# Patient Record
Sex: FEMALE | Race: WHITE
[De-identification: names, ages, dates, MRNs, and addresses within clinical notes are randomized per-mention and may not be internally consistent; named-entity substitution may affect disease eponyms.]

---

## 2017-01-06 ENCOUNTER — HOSPITAL ENCOUNTER (EMERGENCY)
Dept: HOSPITAL 25 - UCCORT | Age: 42
Discharge: HOME | End: 2017-01-06
Payer: COMMERCIAL

## 2017-01-06 VITALS — DIASTOLIC BLOOD PRESSURE: 80 MMHG | SYSTOLIC BLOOD PRESSURE: 127 MMHG

## 2017-01-06 DIAGNOSIS — J01.90: Primary | ICD-10-CM

## 2017-01-06 DIAGNOSIS — Z88.3: ICD-10-CM

## 2017-01-06 DIAGNOSIS — B96.89: ICD-10-CM

## 2017-01-06 PROCEDURE — 99212 OFFICE O/P EST SF 10 MIN: CPT

## 2017-01-06 PROCEDURE — G0463 HOSPITAL OUTPT CLINIC VISIT: HCPCS

## 2017-01-06 NOTE — UC
Throat Pain/Nasal Rikki HPI





- HPI Summary


HPI Summary: 





About 3 weeks ago had a couple days of runny nose and PND, quickly turned into 

maxillary sinus pressure and burning, currently has pain and pressure in upper 

teeth and forehead. Hx of bacterial sinusitis, has seen ENT out of state in the 

past. Denies fever, trouble breathing, or facial swelling.





About 6 weeks ago moved into new house that previously had cats, and pt is 

allergic to cats.





- History of Current Complaint


Chief Complaint: UCRespiratory


Stated Complaint: SINUS


Time Seen by Provider: 01/06/17 10:24


Hx Obtained From: Patient


Hx Last Menstrual Period: 12/27/16


Pregnant?: No


Onset/Duration: Gradual Onset, Lasting Weeks


Severity: Moderate


Cough: None


Associated Signs & Symptoms: Positive: Sinus Discomfort, Nasal Discharge.  

Negative: Fever, Vomiting, Rash





- Allergies/Home Medications


Allergies/Adverse Reactions: 


 Allergies











Allergy/AdvReac Type Severity Reaction Status Date / Time


 


Clarithromycin [From Biaxin] Allergy Severe Vomiting Verified 01/06/17 10:26











Home Medications: 


 Home Medications





Ibuprofen TAB* [Advil TAB*] 600 mg PO Q6H PRN 01/06/17 [History Confirmed 01/06/ 17]











PMH/Surg Hx/FS Hx/Imm Hx


Previously Healthy: Yes


Endocrine History Of: 


   Denies: Diabetes, Thyroid Disease, Hyperthyroidism, Hypothyroidism, 

Dyslipidemia


Cardiovascular History Of: 


   Denies: Cardiac Disorders, Hypertension, Pacemaker/ICD, Myocardial Infarction

, Congestive Heart Failure, Atrial Fibrillation, Deep Vein Thrombosis, Bleeding 

Disorders


Respiratory History Of: 


   Denies: COPD, Asthma, Bronchitis, Pneumonia, Pulmonary Embolism


GI/ History Of: 


   Denies: Gastroesophageal Reflux, Ulcer, Gastrointestinal Bleed, Gall Bladder 

Disease, Kidney Stones, Diverticulitis, Renal Disease, Urosepsis


Neurological History Of: 


   Denies: TIA, CVA, Dementia, Seizures, Migraine


Psychological History Of: Reports: Anxiety


   Denies: Depression, Bipolar Disorder, Schizophrenia, Post Traumatic Stress 

Disorder


Cancer History Of: 


   Denies: Lung Cancer, Colorectal Cancer, Breast Cancer, Prostate Cancer, 

Cervical Cancer





- Surgical History


Surgical History: Yes


Surgery Procedure, Year, and Place: Tubal ligation, uterine ablation, 2 uterine 

d and c





- Family History


Known Family History: 


   Negative: Blood Disorder





- Social History


Occupation: Employed Full-time


Lives: With Family


Alcohol Use: Daily


Alcohol Amount: 1 wine


Substance Use Type: None


Smoking Status (MU): Never Smoked Tobacco





- Immunization History


Most Recent Influenza Vaccination: Current for 2015/2016 Season





Review of Systems


Constitutional: Negative


Skin: Negative


Eyes: Negative


ENT: Nasal Discharge, Other - sinus discomfort


Respiratory: Negative


Cardiovascular: Negative


Gastrointestinal: Negative


Genitourinary: Negative


Motor: Negative


Neurovascular: Negative


Musculoskeletal: Negative


Neurological: Negative


Psychological: Negative


All Other Systems Reviewed And Are Negative: Yes





Physical Exam


Triage Information Reviewed: Yes


Appearance: Well-Appearing, No Pain Distress, Well-Nourished


Vital Signs: 


 Initial Vital Signs











Temp  99.3 F   01/06/17 10:20


 


Pulse  78   01/06/17 10:20


 


Resp  16   01/06/17 10:20


 


BP  127/80   01/06/17 10:20


 


Pulse Ox  100   01/06/17 10:20











Vital Signs Reviewed: Yes


Eye Exam: Normal, Other - PERRL


Eyes: Positive: Conjunctiva Clear


ENT: Positive: Hearing grossly normal, Pharynx normal, Nasal congestion, TMs 

normal, Other: - sinus tenderness.  Negative: Nasal drainage, Tonsillar swelling


Dental Exam: Normal


Dental: Negative: Percussion Tenderness @, Gross Decay/Caries @, Dental 

Fracture @


Neck exam: Normal


Neck: Positive: Supple, Nontender, No Lymphadenopathy


Respiratory Exam: Normal


Respiratory: Positive: Chest non-tender, Lungs clear, Normal breath sounds, No 

respiratory distress, No accessory muscle use


Musculoskeletal Exam: Normal


Neurological Exam: Normal


Neurological: Positive: Alert


Psychological Exam: Normal


Skin Exam: Normal





Throat Pain/Nasal Course/Dx





- Differential Dx/Diagnosis


Provider Diagnoses: Acute bacterial sinusitis





Discharge





- Discharge Plan


Condition: Stable


Disposition: HOME


Prescriptions: 


Amoxicillin/Clavulanate TAB* [Augmentin *] 875 mg PO BID #14 tab


Patient Education Materials:  Rhinosinusitis (ED)


Referrals: 


Natalie Aguirre MD [Primary Care Provider] - If Needed

## 2017-03-07 ENCOUNTER — HOSPITAL ENCOUNTER (EMERGENCY)
Dept: HOSPITAL 25 - UCCORT | Age: 42
Discharge: HOME | End: 2017-03-07
Payer: COMMERCIAL

## 2017-03-07 VITALS — DIASTOLIC BLOOD PRESSURE: 88 MMHG | SYSTOLIC BLOOD PRESSURE: 134 MMHG

## 2017-03-07 DIAGNOSIS — Z88.1: ICD-10-CM

## 2017-03-07 DIAGNOSIS — R06.2: ICD-10-CM

## 2017-03-07 DIAGNOSIS — Z87.891: ICD-10-CM

## 2017-03-07 DIAGNOSIS — J02.9: Primary | ICD-10-CM

## 2017-03-07 DIAGNOSIS — H92.01: ICD-10-CM

## 2017-03-07 PROCEDURE — G0463 HOSPITAL OUTPT CLINIC VISIT: HCPCS

## 2017-03-07 PROCEDURE — 87651 STREP A DNA AMP PROBE: CPT

## 2017-03-07 PROCEDURE — 99212 OFFICE O/P EST SF 10 MIN: CPT

## 2017-03-07 NOTE — UC
Throat Pain/Nasal Rikki HPI





- HPI Summary


HPI Summary: 





Patient had what she thought was the flu a week  now has sore throat and right 

ear pain. 





- History of Current Complaint


Chief Complaint: UCRespiratory


Stated Complaint: SORE THROAT/COUGH


Time Seen by Provider: 03/07/17 18:32


Hx Obtained From: Patient


Hx Last Menstrual Period: 2/14/17


Pregnant?: No


Onset/Duration: Sudden Onset, Lasting Days


Severity: Moderate


Cough: Nonproductive


Associated Signs & Symptoms: Positive: Dysphagia, Hoarseness





- Allergies/Home Medications


Allergies/Adverse Reactions: 


 Allergies











Allergy/AdvReac Type Severity Reaction Status Date / Time


 


Clarithromycin [From Biaxin] Allergy Severe Vomiting Verified 03/07/17 18:27














PMH/Surg Hx/FS Hx/Imm Hx


Previously Healthy: Yes


Endocrine History Of: 


   Denies: Diabetes, Thyroid Disease, Hyperthyroidism, Hypothyroidism, 

Dyslipidemia


Cardiovascular History Of: 


   Denies: Cardiac Disorders, Hypertension, Pacemaker/ICD, Myocardial Infarction

, Congestive Heart Failure, Atrial Fibrillation, Deep Vein Thrombosis, Bleeding 

Disorders


Respiratory History Of: 


   Denies: COPD, Asthma, Bronchitis, Pneumonia, Pulmonary Embolism


GI/ History Of: 


   Denies: Gastroesophageal Reflux, Ulcer, Gastrointestinal Bleed, Gall Bladder 

Disease, Kidney Stones, Diverticulitis, Renal Disease, Urosepsis


Neurological History Of: 


   Denies: TIA, CVA, Dementia, Seizures, Migraine


Psychological History Of: Reports: Anxiety


   Denies: Depression, Bipolar Disorder, Schizophrenia, Post Traumatic Stress 

Disorder


Cancer History Of: 


   Denies: Lung Cancer, Colorectal Cancer, Breast Cancer, Prostate Cancer, 

Cervical Cancer





- Surgical History


Surgical History: Yes


Surgery Procedure, Year, and Place: Tubal ligation, uterine ablation, 2 uterine 

d and c





- Family History


Known Family History: 


   Negative: Cardiac Disease, Hypertension, Blood Disorder





- Social History


Alcohol Use: Daily


Alcohol Amount: 1 wine


Substance Use Type: None


Smoking Status (MU): Former Smoker





- Immunization History


Most Recent Influenza Vaccination: Current for 2015/2016 Season





Review of Systems


Constitutional: Fatigue


Skin: Negative


Eyes: Negative


ENT: Sore Throat, Ear Ache


Respiratory: Cough


Cardiovascular: Negative


Gastrointestinal: Negative


Genitourinary: Negative


Motor: Negative


Neurovascular: Negative


Musculoskeletal: Negative


Neurological: Negative


Psychological: Negative


All Other Systems Reviewed And Are Negative: Yes





Physical Exam


Triage Information Reviewed: Yes


Appearance: Well-Nourished, Ill-Appearing, Pain Distress


Vital Signs: 


 Initial Vital Signs











Temp  99.5 F   03/07/17 18:18


 


Pulse  82   03/07/17 18:18


 


Resp  18   03/07/17 18:18


 


BP  134/88   03/07/17 18:18


 


Pulse Ox  100   03/07/17 18:18











Vital Signs Reviewed: Yes


Eye Exam: Normal


Eyes: Positive: Conjunctiva Clear


ENT: Positive: Pharyngeal erythema - with posterior exudate, TM bulging, 

Tonsillar swelling, Tonsillar exudate, Muffled/hoarse voice


Dental Exam: Normal


Neck exam: Normal


Neck: Positive: Supple, Nontender, No Lymphadenopathy


Respiratory Exam: Normal


Respiratory: Positive: Chest non-tender, No respiratory distress, No accessory 

muscle use, Wheezing, Inspiration


Cardiovascular Exam: Normal


Cardiovascular: Positive: RRR, No Murmur, Pulses Normal


Abdominal Exam: Normal


Abdomen Description: Positive: Nontender, No Organomegaly, Soft


Bowel Sounds: Positive: Present


Musculoskeletal Exam: Normal


Musculoskeletal: Positive: Strength Intact, ROM Intact, No Edema


Neurological Exam: Normal


Neurological: Positive: Alert, Muscle Tone Normal


Psychological Exam: Normal





Throat Pain/Nasal Course/Dx





- Course


Course Of Treatment: hx obtained, exam performed, meds reviewed, rapid strep 

obtained and is negative, treated for wheezing





- Differential Dx/Diagnosis


Differential Diagnosis/HQI/PQRI: Influenza, Laryngitis, Otitis Media, 

Pharyngitis, Sinusitis, URI


Provider Diagnoses: wheezing.  pharyngitis





Discharge





- Discharge Plan


Condition: Stable


Disposition: HOME


Prescriptions: 


predniSONE TAB* [Deltasone TAB*] 40 mg PO DAILY #10 tab


Patient Education Materials:  Viral Syndrome (ED)


Additional Instructions: 


I believe you are still experiencing some lasting effects from your illness.


1. Take the prednisone starting tomorrow morning.


2. Use the cough syrup as needed.


#. increase your fluid intake and get plenty of rest.

## 2017-03-21 ENCOUNTER — HOSPITAL ENCOUNTER (EMERGENCY)
Dept: HOSPITAL 25 - UCCORT | Age: 42
Discharge: HOME | End: 2017-03-21
Payer: COMMERCIAL

## 2017-03-21 VITALS — DIASTOLIC BLOOD PRESSURE: 80 MMHG | SYSTOLIC BLOOD PRESSURE: 138 MMHG

## 2017-03-21 DIAGNOSIS — J32.9: Primary | ICD-10-CM

## 2017-03-21 DIAGNOSIS — Z88.1: ICD-10-CM

## 2017-03-21 DIAGNOSIS — Z87.891: ICD-10-CM

## 2017-03-21 PROCEDURE — G0463 HOSPITAL OUTPT CLINIC VISIT: HCPCS

## 2017-03-21 PROCEDURE — 99212 OFFICE O/P EST SF 10 MIN: CPT

## 2017-03-21 NOTE — UC
Throat Pain/Nasal Rikki HPI





- HPI Summary


HPI Summary: 





complaint of sore throat, nasal congestion and cough 


started approx 2 weeks ago


sinus pressure which has been worsening this week


productive cough with green sputum


 feels exhausted


 intermittent headaches


ears feel plugged


denies fever and chills


 was taking ibuprofen for headache relief





- History of Current Complaint


Chief Complaint: UCRespiratory


Stated Complaint: SORE THROAT


Time Seen by Provider: 03/21/17 11:09


Hx Obtained From: Patient


Hx Last Menstrual Period: 3/19/17





- Allergies/Home Medications


Allergies/Adverse Reactions: 


 Allergies











Allergy/AdvReac Type Severity Reaction Status Date / Time


 


Clarithromycin [From Biaxin] Allergy Severe Vomiting Verified 03/21/17 11:17














PMH/Surg Hx/FS Hx/Imm Hx


Previously Healthy: Yes


Endocrine History Of: 


   Denies: Diabetes, Thyroid Disease, Hyperthyroidism, Hypothyroidism, 

Dyslipidemia


Cardiovascular History Of: 


   Denies: Cardiac Disorders, Hypertension, Pacemaker/ICD, Myocardial Infarction

, Congestive Heart Failure, Atrial Fibrillation, Deep Vein Thrombosis, Bleeding 

Disorders


Respiratory History Of: 


   Denies: COPD, Asthma, Bronchitis, Pneumonia, Pulmonary Embolism


GI/ History Of: 


   Denies: Gastroesophageal Reflux, Ulcer, Gastrointestinal Bleed, Gall Bladder 

Disease, Kidney Stones, Diverticulitis, Renal Disease, Urosepsis


Neurological History Of: 


   Denies: TIA, CVA, Dementia, Seizures, Migraine


Psychological History Of: Reports: Anxiety


   Denies: Depression, Bipolar Disorder, Schizophrenia, Post Traumatic Stress 

Disorder


Cancer History Of: 


   Denies: Lung Cancer, Colorectal Cancer, Breast Cancer, Prostate Cancer, 

Cervical Cancer





- Surgical History


Surgical History: Yes


Surgery Procedure, Year, and Place: Tubal ligation, uterine ablation, 2 uterine 

d and c





- Family History


Known Family History: Positive: None


   Negative: Cardiac Disease, Hypertension, Blood Disorder





- Social History


Occupation: Employed Full-time


Lives: With Family


Alcohol Use: Occasionally


Alcohol Amount: 1 wine


Substance Use Type: None


Smoking Status (MU): Former Smoker





- Immunization History


Most Recent Influenza Vaccination: OCT 2016





Review of Systems


Constitutional: Negative


Skin: Negative


Eyes: Negative


ENT: Sore Throat, Ear Ache, Nasal Discharge


Respiratory: Cough


Cardiovascular: Negative


Gastrointestinal: Negative


Genitourinary: Negative


Motor: Negative


Neurovascular: Negative


Musculoskeletal: Negative


Neurological: Negative


Psychological: Negative


All Other Systems Reviewed And Are Negative: Yes





Physical Exam


Triage Information Reviewed: Yes


Appearance: No Pain Distress, Well-Nourished


Vital Signs: 


 Initial Vital Signs











Temp  98.8 F   03/21/17 11:18


 


Pulse  79   03/21/17 11:18


 


Resp  20   03/21/17 11:18


 


BP  138/80   03/21/17 11:18


 


Pulse Ox  99   03/21/17 11:18











Vital Signs Reviewed: Yes


Eyes: Positive: Conjunctiva Clear


ENT: Positive: Pharyngeal erythema, Nasal congestion, Nasal drainage, TMs normal

, Other: - frontal and maxillary sinus tenderness


Neck: Positive: No Lymphadenopathy


Respiratory: Positive: Lungs clear, Normal breath sounds, No respiratory 

distress


Cardiovascular: Positive: RRR, No Murmur, Pulses Normal


Abdomen Description: Positive: Nontender, Soft


Bowel Sounds: Positive: Present


Musculoskeletal: Positive: No Edema


Neurological: Positive: Alert


Psychological Exam: Normal


Skin Exam: Normal





Throat Pain/Nasal Course/Dx





- Differential Dx/Diagnosis


Differential Diagnosis/HQI/PQRI: Sinusitis, Tonsillitis, URI


Provider Diagnoses: sinusitis





Discharge





- Discharge Plan


Condition: Stable


Disposition: HOME


Prescriptions: 


DOXYcycline CAP(*) [DOXYcycline 100MG CAP(*)] 100 mg PO BID #20 cap


Patient Education Materials:  Sinusitis (ED)


Additional Instructions: 


SINUSITIS 


What is Sinusitis? 


Sinusitis is inflammation or infection of the lining of the sinuses behind the 

bones in your cheeks or forehead. Sinusitis may occur following a common cold, 

flu, or other infection; allergies; a tooth infection that spreads to the 

sinuses; swimming in contaminated water; pressure changes in airplanes at high 

altitudes; violent sneezing or nose blowing or smoking or breathing other 

peoples smoke. 


Symptoms Might Include:


  Nasal Congestion 


 Sneezing 


 Watery eyes, eye irritation, or eye itching 


 Headaches 


 Pressure in the cheeks 


 Wheezing 


 Trouble smelling 


Sore throat and coughing may occur 





Treatment Recommendations: 


 Take medicines as prescribed until completely gone. 


 Drink plenty of fluids. 


 Use saline nose spray to thin the mucous and help the sinuses drain. 


 Use a vaporizer or humidifier. 


 Apply warm compresses to the face or forehead several times a day for 10 to 20 

minutes. 





Call Your Doctor or Return Here IF: 


 Your pain increases during treatment. 


 You develop a high temperature. 


 You develop unusual swelling around the eyes. 


 You have difficulty with your vision. 


 You develop a severe headache, earache, or toothache. 


 You develop increased fever or fever that does not respond to medication such 

as Tylenol?. 


 You have difficulty breathing or catching your breath. 


 You begin to have any other new symptoms that worry you.

## 2017-08-16 ENCOUNTER — HOSPITAL ENCOUNTER (EMERGENCY)
Dept: HOSPITAL 25 - UCCORT | Age: 42
Discharge: HOME | End: 2017-08-16
Payer: COMMERCIAL

## 2017-08-16 VITALS — SYSTOLIC BLOOD PRESSURE: 126 MMHG | DIASTOLIC BLOOD PRESSURE: 78 MMHG

## 2017-08-16 DIAGNOSIS — J06.9: Primary | ICD-10-CM

## 2017-08-16 DIAGNOSIS — J00: ICD-10-CM

## 2017-08-16 DIAGNOSIS — Z88.1: ICD-10-CM

## 2017-08-16 PROCEDURE — G0463 HOSPITAL OUTPT CLINIC VISIT: HCPCS

## 2017-08-16 PROCEDURE — 99212 OFFICE O/P EST SF 10 MIN: CPT

## 2017-08-16 NOTE — UC
Respiratory Complaint HPI





- HPI Summary


HPI Summary: 





Congestion and sore throat for about 6 days. There is left ear pressure and a 

mild non productive cough. tylenol has helped. 





- History of Current Complaint


Chief Complaint: UCRespiratory


Stated Complaint: SORE THROAT,EAR PAIN


Time Seen by Provider: 08/16/17 08:32


Hx Obtained From: Patient


Hx Last Menstrual Period: 7/21/17


Onset/Duration: Gradual Onset, Lasting Days


Timing: Constant


Severity Initially: Mild


Severity Currently: Moderate


Character: Cough: Nonproductive


Aggravating Factors: Deep Breaths, Recumbent Position


Alleviating Factors: OTC Meds


Associated Signs And Symptoms: Positive: URI, Nasal Congestion - temp yesterday 

was 99.6





- Allergies/Home Medications


Allergies/Adverse Reactions: 


 Allergies











Allergy/AdvReac Type Severity Reaction Status Date / Time


 


Clarithromycin [From Biaxin] Allergy Severe Vomiting Verified 08/16/17 08:21











Home Medications: 


 Home Medications





Acetaminophen [Acetaminophen Extra Stren] 1,000 mg PO ONCE PRN 08/16/17 [

History Confirmed 08/16/17]











PMH/Surg Hx/FS Hx/Imm Hx


Previously Healthy: Yes - no lung disease. 





- Surgical History


Surgical History: Yes


Surgery Procedure, Year, and Place: Tubal ligation, uterine ablation, 2 uterine 

d and c





- Family History


Known Family History: Positive: None


   Negative: Cardiac Disease, Hypertension, Blood Disorder





- Social History


Lives: With Family


Alcohol Use: Weekly


Alcohol Amount: 1 wine


Substance Use Type: None


Smoking Status (MU): Never Smoked Tobacco





- Immunization History


Most Recent Influenza Vaccination: Current for 2015/2016 Season





Review of Systems


Respiratory: Cough


All Other Systems Reviewed And Are Negative: Yes





Physical Exam


Triage Information Reviewed: Yes


Appearance: Well-Appearing, No Pain Distress, Well-Nourished


Vital Signs: 


 Initial Vital Signs











Temp  98 F   08/16/17 08:22


 


Pulse  78   08/16/17 08:22


 


Resp  16   08/16/17 08:22


 


BP  126/78   08/16/17 08:22


 


Pulse Ox  98   08/16/17 08:22











Vital Signs Reviewed: Yes


Eye Exam: Normal


ENT Exam: Normal


ENT: Positive: Other: - Normal except for mild congestion clear fluid mihir ears.


Neck exam: Normal


Respiratory Exam: Normal


Cardiovascular Exam: Normal


Abdominal Exam: Normal


Musculoskeletal Exam: Normal


Neurological Exam: Normal


Psychological Exam: Normal


Skin Exam: Normal





UC Diagnostic Evaluation





- Laboratory


O2 Sat by Pulse Oximetry: 98





Respiratory Course/Dx





- Course


Course Of Treatment: NO signs of bacterial infection. No purulent exudates. NO 

fever. We described robitussin DM and delayed antibiotics.





- Differential Dx/Diagnosis


Differential Diagnosis/HQI/PQRI: Bronchitis, Influenza, Laryngitis, Lower Resp 

Infection, Pneumothorax, Sinusitis, Tuberculosis


Provider Diagnoses: head cold.  uri.  viral.





Discharge





- Discharge Plan


Condition: Good


Disposition: HOME


Prescriptions: 


DOXYcycline CAP(*) [DOXYcycline 100MG CAP(*)] 100 mg PO BID #20 cap


Patient Education Materials:  Upper Respiratory Infection (ED)


Referrals: 


Natalie Aguirre MD [Primary Care Provider] - If Needed


Additional Instructions: 


Robitussin DM for the cough.

## 2019-12-21 ENCOUNTER — HOSPITAL ENCOUNTER (EMERGENCY)
Dept: HOSPITAL 25 - UCCORT | Age: 44
Discharge: HOME | End: 2019-12-21
Payer: COMMERCIAL

## 2019-12-21 VITALS — DIASTOLIC BLOOD PRESSURE: 83 MMHG | SYSTOLIC BLOOD PRESSURE: 128 MMHG

## 2019-12-21 DIAGNOSIS — B34.9: Primary | ICD-10-CM

## 2019-12-21 DIAGNOSIS — R11.0: ICD-10-CM

## 2019-12-21 DIAGNOSIS — R10.9: ICD-10-CM

## 2019-12-21 DIAGNOSIS — Z88.1: ICD-10-CM

## 2019-12-21 DIAGNOSIS — J34.89: ICD-10-CM

## 2019-12-21 PROCEDURE — G0463 HOSPITAL OUTPT CLINIC VISIT: HCPCS

## 2019-12-21 PROCEDURE — 87086 URINE CULTURE/COLONY COUNT: CPT

## 2019-12-21 PROCEDURE — 99212 OFFICE O/P EST SF 10 MIN: CPT

## 2019-12-21 PROCEDURE — 81003 URINALYSIS AUTO W/O SCOPE: CPT

## 2019-12-21 NOTE — UC
UC General HPI





- HPI Summary


HPI Summary: 





Just hasn't felt well the past 3 days. Has had a runny nose, body aches, no 

fever, but chills. No urinary symptoms. Feels a Burning feeling in epigastric 

area with occasional nausea but no vomiting. Looser stools than normal.





- History of Current Complaint


Chief Complaint: UCGeneralIllness


Stated Complaint: FLU LIKE SYMPTOMS


Time Seen by Provider: 12/21/19 15:08


Hx Obtained From: Patient


Hx Last Menstrual Period: 8/24/19


Onset/Duration: Gradual Onset


Timing: Constant


Onset Severity: Mild


Current Severity: Mild


Pain Intensity: 5


Associated Signs & Symptoms: Positive: Headache - Occasional mild headache, 

Nausea





- Allergy/Home Medications


Allergies/Adverse Reactions: 


 Allergies











Allergy/AdvReac Type Severity Reaction Status Date / Time


 


clarithromycin [From Biaxin] Allergy  Vomiting Verified 12/21/19 15:05











Home Medications: 


 Home Medications





LORazepam TAB(*) [Ativan 0.5 MG TAB (*)] 0.5 mg PO BEDTIME PRN 12/21/19 [

History Confirmed 12/21/19]











PMH/Surg Hx/FS Hx/Imm Hx


Previously Healthy: Yes





- Surgical History


Surgical History: Yes


Surgery Procedure, Year, and Place: Tubal ligation, uterine ablation, 2 uterine 

d and c





- Family History


Known Family History: Positive: None


   Negative: Cardiac Disease, Hypertension, Blood Disorder





- Social History


Lives: With Family


Alcohol Use: Occasionally


Alcohol Amount: 1 wine


Substance Use Type: None


Smoking Status (MU): Never Smoked Tobacco





- Immunization History


Most Recent Influenza Vaccination: Current for 2015/2016 Season





Review of Systems


All Other Systems Reviewed And Are Negative: Yes


Constitutional: Positive: Chills


ENT: Positive: Nasal Discharge


Gastrointestinal: Positive: Abdominal Pain - No specific abdominal pain but 

feels like epigastric burning with nausea. Has not changed over the past 3 days.

, Diarrhea - Looser stools than normal but not diarrhea, Nausea


Is Patient Immunocompromised?: No





Physical Exam


Triage Information Reviewed: Yes


Appearance: Well-Appearing, No Pain Distress, Well-Nourished


Vital Signs: 


 Initial Vital Signs











Temp  98.3 F   12/21/19 15:02


 


Pulse  84   12/21/19 15:02


 


Resp  18   12/21/19 15:02


 


BP  128/83   12/21/19 15:02


 


Pulse Ox  98   12/21/19 15:02











Vital Signs Reviewed: Yes


Eyes: Positive: Conjunctiva Clear


ENT: Positive: Pharynx normal, TMs normal, Uvula midline


Neck: Positive: Supple, Nontender, No Lymphadenopathy


Respiratory: Positive: Lungs clear, Normal breath sounds, No respiratory 

distress, No accessory muscle use


Cardiovascular: Positive: RRR, No Murmur, Pulses Normal, Brisk Capillary Refill


Abdomen Description: Positive: Nontender, No Organomegaly, Soft.  Negative: CVA 

Tenderness (R), CVA Tenderness (L), Distended, Guarding, Hepatomegaly, 

Splenomegaly


Bowel Sounds: Positive: Present


Musculoskeletal Exam: Normal


Neurological Exam: Normal


Psychological Exam: Normal


Skin Exam: Normal





Course/Dx





- Course


Course Of Treatment: 





Urinalysis: Trace of leukocytes





The urinalysis will be sent for culture and I advised the patient of this.  She 

is to increase fluids avoid fatty and spicy foods but definite recheck in 24 

hours if her symptoms of worsened.





- Diagnoses


Provider Diagnosis: 


 Nausea, Viral illness








Discharge ED





- Sign-Out/Discharge


Documenting (check all that apply): Patient Departure


All imaging exams completed and their final reports reviewed: No Studies





- Discharge Plan


Condition: Fair


Disposition: HOME


Prescriptions: 


Ondansetron TAB* [Zofran 4 MG Tab*] 4 mg PO Q6H PRN #10 tab


 PRN Reason: Nausea


Patient Education Materials:  Viral Syndrome (ED)


Referrals: 


Angelina Watts NP [Primary Care Provider] - 


Additional Instructions: 


Avoid spicy and fatty foods. [place] diet and liquids today. Go to the ER if you 

have any worsening symptoms.





- Billing Disposition and Condition


Condition: FAIR


Disposition: Home

## 2019-12-21 NOTE — XMS REPORT
Continuity of Care Document (CCD)

 Created on:2019



Patient:Tarsha Leal

Sex:Female

:1975

External Reference #:[id number]





Demographics







 Address  [address] [place] [id number]

 

 Home Phone  [phone]

 

 Mobile Phone  [phone]

 

 Preferred Language  en

 

 Marital Status  Declined to Specify/Unknown

 

 Restorationist Affiliation  Unknown

 

 Race  White

 

 Ethnic Group  Declined to Specify/Unknown









Author







 Name  Shelley Herrera FNP (transmitted by agent of provider Julianne Wallace)

 

 Address  [address] [place] [id number]









Care Team Providers







 Name  Role  Phone

 

 Angelina Watts PNP-BC, FNP, Ibclc  Care Team Information   [phone]
[phone]



 - Family    









Problems







 Active Problems  Provider  Date

 

 Screening mammography  Angelina Watts PNP-BC, FNP,  Onset: 2018



   Ibclc  

 

 Migraine without aura, not  Angelina Watts PNP-BC, FNP,  Onset: 2018



 refractory  Ibclc  

 

 Anxiety state  Angelina Watts PNP-BC, FNP,  Onset: 2018



   Ibclc  







Social History







 Type  Date  Description  Comments

 

 Birth Sex    Unknown  

 

 ETOH Use    Currently consumes alcohol socially  

 

 Tobacco Use  Start: Unknown  Patient denies history of smoking  

 

 Smoking Status  Reviewed: 10/25/19  Patient denies history of smoking  







Allergies, Adverse Reactions, Alerts







 Active Allergies  Reaction  Severity  Comments  Date

 

 Biaxin        2018

 

 [place]        2018

 

 Peppers        2018







Medications







 Active Medications  SIG  Qnty  Indications  Ordering Provider  Date

 

 Imitrex  1 tablet at start  14tabs  G43.009  Angelina Watts,  2018



        50mg Tablets  of migraine, may      PNP-BC, FNP,  



   repeat in 2 hours      Ibclc  



   if needed        

 

 Lorazepam  1-2 tabs by mouth  30tabs    Angelina Watts,  



          0.5mg  as needed      DASHA-BC FNP,  



 Tablets  Reference #:      Libanclc  



   [id number]        







Immunizations







 CPT Code  Status  Date  Vaccine  Lot #

 

 [id number]  Given  10/15/2018  MMR Vaccine, Live, For Subcutaneous Use  [id number]







Vital Signs







 Date  Vital  Result  Comment

 

 10/25/2019  4:30pm  BP Systolic  152 mmHg  









 BP Diastolic  86 mmHg  

 

 Body Temperature  98.0 F  

 

 Heart Rate  91 /min  

 

 Respiratory Rate  16 /min  

 

 Weight  141.38 lb  

 

 O2 % BldC Oximetry  100 %  

 

 Pain Level  5  









 09/10/2018  1:25pm  BP Systolic Sitting Right Arm  114 mmHg  









 BP Diastolic Sitting Right Arm  74 mmHg  

 

 Body Temperature  98.3 F  

 

 Heart Rate  81 /min  

 

 Weight  175.00 lb  

 

 O2 % BldC Oximetry  98 %  







Results







 Description

 

 No Information Available







Procedures







 Date  Code  Description  Status

 

 2018  [id number]  Mammogram  Completed







Medical Devices







 Description

 

 No Information Available







Encounters







 Type  Date  Location  Provider  Dx  Diagnosis

 

 Office Visit  [facility]/25/2019  [facility]  Sharon,  S93.402A  Sprain of



   4:00p    Shelley M., FNP    unspecified



           ligament of left



           ankle, init encntr









 W10.9xxA  Fall (on) (from) unspecified stairs and steps, init encntr







Assessments







 Date  Code  Description  Provider

 

 10/25/2019  S93.402A  Sprain of unspecified ligament of  Meade-Fadi, 
Shelley M., FNP



     left ankle, initial encounter  

 

 10/25/2019  W10.9xxA  Fall (on) (from) unspecified  Meade-Helm, Shelley M., 
FNP



     stairs and steps, initial  



     encounter  







Plan of Treatment

10/25/2019 - Shelley Herrera, FNPS93.402A Sprain of unspecified 
ligament of left ankle, initial encounterComments:Elevate when possible.  Wear 
splint regularly for the next week. Rest, Apply Ice or cool compress 5-10 
minutes 3-4 times per day, and use OTC NSAIDs such as naproxen or Ibuprofen as 
needed.Followup with your primary physician in 5-7 days for recheck, sooner if 
new or worsening symptoms occur.W10.9xxA Fall (on) (from) unspecified stairs 
and steps, initial encounter



Functional Status







 Functional Condition  Comment  Date  Status

 

 Hearing Aid in Both ears  jayme monson  2017  Active







Mental Status







 Description

 

 No Information Available







Referrals







 Description

 

 No Information Available